# Patient Record
Sex: MALE | Race: WHITE | URBAN - METROPOLITAN AREA
[De-identification: names, ages, dates, MRNs, and addresses within clinical notes are randomized per-mention and may not be internally consistent; named-entity substitution may affect disease eponyms.]

---

## 2020-06-14 ENCOUNTER — HOSPITAL ENCOUNTER (OUTPATIENT)
Dept: MEDSURG UNIT | Facility: HOSPITAL | Age: 60
End: 2020-06-15
Attending: INTERNAL MEDICINE | Admitting: INTERNAL MEDICINE

## 2020-06-14 LAB
ABS NEUT (OLG): 6.5 X10(3)/MCL (ref 2.1–9.2)
ALBUMIN SERPL-MCNC: 3.4 GM/DL (ref 3.4–5)
ALBUMIN/GLOB SERPL: 1 RATIO (ref 1.1–2)
ALP SERPL-CCNC: 119 UNIT/L (ref 45–117)
ALT SERPL-CCNC: 43 UNIT/L (ref 12–78)
AMPHET UR QL SCN: NEGATIVE
APPEARANCE, UA: CLEAR
APTT PPP: 25.8 SECOND(S) (ref 23.3–37)
AST SERPL-CCNC: 27 UNIT/L (ref 15–37)
BACTERIA #/AREA URNS AUTO: ABNORMAL /HPF
BARBITURATE SCN PRESENT UR: NEGATIVE
BASOPHILS # BLD AUTO: 0 X10(3)/MCL (ref 0–0.2)
BASOPHILS NFR BLD AUTO: 0 %
BENZODIAZ UR QL SCN: NEGATIVE
BILIRUB SERPL-MCNC: 0.7 MG/DL (ref 0.2–1)
BILIRUB UR QL STRIP: NEGATIVE
BILIRUBIN DIRECT+TOT PNL SERPL-MCNC: 0.2 MG/DL (ref 0–0.2)
BILIRUBIN DIRECT+TOT PNL SERPL-MCNC: 0.5 MG/DL
BUN SERPL-MCNC: 18 MG/DL (ref 7–18)
CALCIUM SERPL-MCNC: 8.6 MG/DL (ref 8.5–10.1)
CANNABINOIDS UR QL SCN: NEGATIVE
CHLORIDE SERPL-SCNC: 107 MMOL/L (ref 98–107)
CK MB SERPL-MCNC: 1.8 NG/ML (ref 1–3.6)
CK SERPL-CCNC: 72 UNIT/L (ref 39–308)
CO2 SERPL-SCNC: 25 MMOL/L (ref 21–32)
COCAINE UR QL SCN: NEGATIVE
COLOR UR: ABNORMAL
CREAT SERPL-MCNC: 0.8 MG/DL (ref 0.6–1.3)
D DIMER PPP IA.FEU-MCNC: 3.48 MCG/ML FEU
EOSINOPHIL # BLD AUTO: 0.2 X10(3)/MCL (ref 0–0.9)
EOSINOPHIL NFR BLD AUTO: 2 %
ERYTHROCYTE [DISTWIDTH] IN BLOOD BY AUTOMATED COUNT: 13.3 % (ref 11.5–14.5)
EST. AVERAGE GLUCOSE BLD GHB EST-MCNC: 97 MG/DL
GLOBULIN SER-MCNC: 3.4 GM/ML (ref 2.3–3.5)
GLUCOSE (UA): NEGATIVE
GLUCOSE SERPL-MCNC: 98 MG/DL (ref 74–106)
HBA1C MFR BLD: 5 % (ref 4.2–6.3)
HCT VFR BLD AUTO: 41.6 % (ref 40–51)
HGB BLD-MCNC: 14.1 GM/DL (ref 13.5–17.5)
HGB UR QL STRIP: NEGATIVE
HYALINE CASTS #/AREA URNS LPF: ABNORMAL /LPF
IMM GRANULOCYTES # BLD AUTO: 0.02 10*3/UL
IMM GRANULOCYTES NFR BLD AUTO: 0 %
INR PPP: 1.08 (ref 0.9–1.2)
KETONES UR QL STRIP: ABNORMAL
LEUKOCYTE ESTERASE UR QL STRIP: NEGATIVE
LYMPHOCYTES # BLD AUTO: 1.3 X10(3)/MCL (ref 0.6–4.6)
LYMPHOCYTES NFR BLD AUTO: 15 %
MAGNESIUM SERPL-MCNC: 1.9 MG/DL (ref 1.6–2.6)
MCH RBC QN AUTO: 33.8 PG (ref 26–34)
MCHC RBC AUTO-ENTMCNC: 33.9 GM/DL (ref 31–37)
MCV RBC AUTO: 99.8 FL (ref 80–100)
MONOCYTES # BLD AUTO: 0.6 X10(3)/MCL (ref 0.1–1.3)
MONOCYTES NFR BLD AUTO: 7 %
NEUTROPHILS # BLD AUTO: 6.5 X10(3)/MCL (ref 2.1–9.2)
NEUTROPHILS NFR BLD AUTO: 76 %
NITRITE UR QL STRIP: NEGATIVE
OPIATES UR QL SCN: NEGATIVE
PCP UR QL: NEGATIVE
PH UR STRIP.AUTO: 6 [PH] (ref 5–8)
PH UR STRIP: 6 [PH] (ref 4.5–8)
PHOSPHATE SERPL-MCNC: 4 MG/DL (ref 2.5–4.9)
PLATELET # BLD AUTO: 182 X10(3)/MCL (ref 130–400)
PMV BLD AUTO: 9.5 FL (ref 7.4–10.4)
POTASSIUM SERPL-SCNC: 4.2 MMOL/L (ref 3.5–5.1)
PROT SERPL-MCNC: 6.8 GM/DL (ref 6.4–8.2)
PROT UR QL STRIP: NEGATIVE
PROTHROMBIN TIME: 13.6 SECOND(S) (ref 11.9–14.4)
RBC # BLD AUTO: 4.17 X10(6)/MCL (ref 4.5–5.9)
RBC #/AREA URNS AUTO: ABNORMAL /HPF
SARS-COV-2 AG RESP QL IA.RAPID: NEGATIVE
SODIUM SERPL-SCNC: 136 MMOL/L (ref 136–145)
SP GR UR STRIP: 1.04 (ref 1–1.03)
SQUAMOUS #/AREA URNS LPF: ABNORMAL /LPF
TEMPERATURE, URINE (OHS): 23 DEGC (ref 20–25)
TROPONIN I SERPL-MCNC: 0.03 NG/ML (ref 0–0.05)
UROBILINOGEN UR STRIP-ACNC: NORMAL
WBC # SPEC AUTO: 8.6 X10(3)/MCL (ref 4.5–11)
WBC #/AREA URNS AUTO: ABNORMAL /HPF

## 2020-06-15 LAB
ABS NEUT (OLG): 8.62 X10(3)/MCL (ref 2.1–9.2)
ALBUMIN SERPL-MCNC: 3.4 GM/DL (ref 3.4–5)
ALBUMIN/GLOB SERPL: 0.9 RATIO (ref 1.1–2)
ALP SERPL-CCNC: 126 UNIT/L (ref 45–117)
ALT SERPL-CCNC: 41 UNIT/L (ref 12–78)
AST SERPL-CCNC: 25 UNIT/L (ref 15–37)
BASOPHILS # BLD AUTO: 0 X10(3)/MCL (ref 0–0.2)
BASOPHILS NFR BLD AUTO: 0 %
BILIRUB SERPL-MCNC: 1.1 MG/DL (ref 0.2–1)
BILIRUBIN DIRECT+TOT PNL SERPL-MCNC: 0.4 MG/DL (ref 0–0.2)
BILIRUBIN DIRECT+TOT PNL SERPL-MCNC: 0.7 MG/DL
BUN SERPL-MCNC: 17 MG/DL (ref 7–18)
CALCIUM SERPL-MCNC: 8.8 MG/DL (ref 8.5–10.1)
CHLORIDE SERPL-SCNC: 106 MMOL/L (ref 98–107)
CHOLEST SERPL-MCNC: 160 MG/DL
CHOLEST/HDLC SERPL: 3.7 {RATIO} (ref 0–5)
CO2 SERPL-SCNC: 23 MMOL/L (ref 21–32)
CREAT SERPL-MCNC: 0.8 MG/DL (ref 0.6–1.3)
EOSINOPHIL # BLD AUTO: 0 X10(3)/MCL (ref 0–0.9)
EOSINOPHIL NFR BLD AUTO: 0 %
ERYTHROCYTE [DISTWIDTH] IN BLOOD BY AUTOMATED COUNT: 13.1 % (ref 11.5–14.5)
GLOBULIN SER-MCNC: 3.6 GM/ML (ref 2.3–3.5)
GLUCOSE SERPL-MCNC: 116 MG/DL (ref 74–106)
HCT VFR BLD AUTO: 41.6 % (ref 40–51)
HDLC SERPL-MCNC: 43 MG/DL (ref 40–59)
HGB BLD-MCNC: 14.1 GM/DL (ref 13.5–17.5)
IMM GRANULOCYTES # BLD AUTO: 0.05 10*3/UL
IMM GRANULOCYTES NFR BLD AUTO: 0 %
INR PPP: 1.08 (ref 0.9–1.2)
LDLC SERPL CALC-MCNC: 108 MG/DL
LYMPHOCYTES # BLD AUTO: 0.7 X10(3)/MCL (ref 0.6–4.6)
LYMPHOCYTES NFR BLD AUTO: 8 %
MAGNESIUM SERPL-MCNC: 1.7 MG/DL (ref 1.6–2.6)
MCH RBC QN AUTO: 32.9 PG (ref 26–34)
MCHC RBC AUTO-ENTMCNC: 33.9 GM/DL (ref 31–37)
MCV RBC AUTO: 97.2 FL (ref 80–100)
MONOCYTES # BLD AUTO: 0.2 X10(3)/MCL (ref 0.1–1.3)
MONOCYTES NFR BLD AUTO: 2 %
NEUTROPHILS # BLD AUTO: 8.62 X10(3)/MCL (ref 2.1–9.2)
NEUTROPHILS NFR BLD AUTO: 90 %
PHOSPHATE SERPL-MCNC: 3.5 MG/DL (ref 2.5–4.9)
PLATELET # BLD AUTO: 185 X10(3)/MCL (ref 130–400)
PMV BLD AUTO: 9.9 FL (ref 7.4–10.4)
POTASSIUM SERPL-SCNC: 3.8 MMOL/L (ref 3.5–5.1)
PROT SERPL-MCNC: 7 GM/DL (ref 6.4–8.2)
PROTHROMBIN TIME: 13.6 SECOND(S) (ref 11.9–14.4)
RBC # BLD AUTO: 4.28 X10(6)/MCL (ref 4.5–5.9)
SODIUM SERPL-SCNC: 137 MMOL/L (ref 136–145)
TRIGL SERPL-MCNC: 44 MG/DL
TSH SERPL-ACNC: 1.7 MIU/L (ref 0.36–3.74)
VLDLC SERPL CALC-MCNC: 9 MG/DL
WBC # SPEC AUTO: 9.6 X10(3)/MCL (ref 4.5–11)

## 2021-05-11 ENCOUNTER — HOSPITAL ENCOUNTER (OUTPATIENT)
Dept: MEDSURG UNIT | Facility: HOSPITAL | Age: 61
End: 2021-05-14
Attending: INTERNAL MEDICINE | Admitting: EMERGENCY MEDICINE

## 2021-05-11 LAB
ABS NEUT (OLG): 9.85 X10(3)/MCL (ref 2.1–9.2)
ALBUMIN SERPL-MCNC: 4.1 GM/DL (ref 3.4–4.8)
ALBUMIN/GLOB SERPL: 1.2 RATIO (ref 1.1–2)
ALP SERPL-CCNC: 139 UNIT/L (ref 40–150)
ALT SERPL-CCNC: 20 UNIT/L (ref 0–55)
AMPHET UR QL SCN: NEGATIVE
AST SERPL-CCNC: 42 UNIT/L (ref 5–34)
BARBITURATE SCN PRESENT UR: NEGATIVE
BASOPHILS # BLD AUTO: 0 X10(3)/MCL (ref 0–0.2)
BASOPHILS NFR BLD AUTO: 0 %
BENZODIAZ UR QL SCN: NEGATIVE
BILIRUB SERPL-MCNC: 2.7 MG/DL
BILIRUBIN DIRECT+TOT PNL SERPL-MCNC: 1.1 MG/DL (ref 0–0.5)
BILIRUBIN DIRECT+TOT PNL SERPL-MCNC: 1.6 MG/DL (ref 0–0.8)
BNP BLD-MCNC: 2487.9 PG/ML
BUN SERPL-MCNC: 22.6 MG/DL (ref 8.4–25.7)
CALCIUM SERPL-MCNC: 9.6 MG/DL (ref 8.8–10)
CANNABINOIDS UR QL SCN: NEGATIVE
CHLORIDE SERPL-SCNC: 99 MMOL/L (ref 98–107)
CO2 SERPL-SCNC: 21 MMOL/L (ref 23–31)
COCAINE UR QL SCN: NEGATIVE
CREAT SERPL-MCNC: 1.13 MG/DL (ref 0.73–1.18)
ERYTHROCYTE [DISTWIDTH] IN BLOOD BY AUTOMATED COUNT: 17.2 % (ref 11.5–17)
GLOBULIN SER-MCNC: 3.4 GM/DL (ref 2.4–3.5)
GLUCOSE SERPL-MCNC: 102 MG/DL (ref 82–115)
HCT VFR BLD AUTO: 44.8 % (ref 42–52)
HGB BLD-MCNC: 14.3 GM/DL (ref 14–18)
INR PPP: 1.5 (ref 0–1.3)
LYMPHOCYTES # BLD AUTO: 0.9 X10(3)/MCL (ref 0.6–4.6)
LYMPHOCYTES NFR BLD AUTO: 8 %
MCH RBC QN AUTO: 28.9 PG (ref 27–31)
MCHC RBC AUTO-ENTMCNC: 31.9 GM/DL (ref 33–36)
MCV RBC AUTO: 90.7 FL (ref 80–94)
MONOCYTES # BLD AUTO: 0.8 X10(3)/MCL (ref 0.1–1.3)
MONOCYTES NFR BLD AUTO: 7 %
NEUTROPHILS # BLD AUTO: 9.85 X10(3)/MCL (ref 2.1–9.2)
NEUTROPHILS NFR BLD AUTO: 84 %
OPIATES UR QL SCN: NEGATIVE
PCP UR QL: NEGATIVE
PH UR STRIP.AUTO: 5 [PH] (ref 5–7.5)
PLATELET # BLD AUTO: 186 X10(3)/MCL (ref 130–400)
PMV BLD AUTO: 10.4 FL (ref 9.4–12.4)
POTASSIUM SERPL-SCNC: 4.2 MMOL/L (ref 3.5–5.1)
PROT SERPL-MCNC: 7.5 GM/DL (ref 5.8–7.6)
PROTHROMBIN TIME: 17.7 SECOND(S) (ref 11.1–13.7)
RBC # BLD AUTO: 4.94 X10(6)/MCL (ref 4.7–6.1)
SODIUM SERPL-SCNC: 138 MMOL/L (ref 136–145)
SP GR FLD REFRACTOMETRY: 1.03 (ref 1–1.03)
TROPONIN I SERPL-MCNC: 0.05 NG/ML (ref 0–0.04)
WBC # SPEC AUTO: 11.7 X10(3)/MCL (ref 4.5–11.5)

## 2021-05-12 LAB
ABS NEUT (OLG): 7.5 X10(3)/MCL (ref 2.1–9.2)
BASOPHILS # BLD AUTO: 0 X10(3)/MCL (ref 0–0.2)
BASOPHILS NFR BLD AUTO: 0 %
BUN SERPL-MCNC: 25.3 MG/DL (ref 8.4–25.7)
CALCIUM SERPL-MCNC: 8.9 MG/DL (ref 8.8–10)
CHLORIDE SERPL-SCNC: 94 MMOL/L (ref 98–107)
CHOLEST SERPL-MCNC: 157 MG/DL
CHOLEST/HDLC SERPL: 4 {RATIO} (ref 0–5)
CK MB SERPL-MCNC: 4.5 NG/ML
CK SERPL-CCNC: 146 U/L (ref 30–200)
CO2 SERPL-SCNC: 26 MMOL/L (ref 23–31)
CREAT SERPL-MCNC: 0.86 MG/DL (ref 0.73–1.18)
CREAT/UREA NIT SERPL: 29
EOSINOPHIL # BLD AUTO: 0 X10(3)/MCL (ref 0–0.9)
EOSINOPHIL NFR BLD AUTO: 0 %
ERYTHROCYTE [DISTWIDTH] IN BLOOD BY AUTOMATED COUNT: 16.8 % (ref 11.5–17)
EST. AVERAGE GLUCOSE BLD GHB EST-MCNC: 102.5 MG/DL
GLUCOSE SERPL-MCNC: 85 MG/DL (ref 82–115)
HBA1C MFR BLD: 5.2 %
HCT VFR BLD AUTO: 41.2 % (ref 42–52)
HDLC SERPL-MCNC: 35 MG/DL (ref 35–60)
HGB BLD-MCNC: 13.6 GM/DL (ref 14–18)
INR PPP: 1.5 (ref 0–1.3)
LDLC SERPL CALC-MCNC: 106 MG/DL (ref 50–140)
LYMPHOCYTES # BLD AUTO: 1.7 X10(3)/MCL (ref 0.6–4.6)
LYMPHOCYTES NFR BLD AUTO: 17 %
MAGNESIUM SERPL-MCNC: 1.5 MG/DL (ref 1.6–2.6)
MCH RBC QN AUTO: 28.8 PG (ref 27–31)
MCHC RBC AUTO-ENTMCNC: 33 GM/DL (ref 33–36)
MCV RBC AUTO: 87.1 FL (ref 80–94)
MONOCYTES # BLD AUTO: 0.9 X10(3)/MCL (ref 0.1–1.3)
MONOCYTES NFR BLD AUTO: 9 %
NEUTROPHILS # BLD AUTO: 7.5 X10(3)/MCL (ref 2.1–9.2)
NEUTROPHILS NFR BLD AUTO: 73 %
PLATELET # BLD AUTO: 167 X10(3)/MCL (ref 130–400)
PMV BLD AUTO: 10 FL (ref 9.4–12.4)
POTASSIUM SERPL-SCNC: 3.1 MMOL/L (ref 3.5–5.1)
PROTHROMBIN TIME: 17.6 SECOND(S) (ref 11.1–13.7)
RBC # BLD AUTO: 4.73 X10(6)/MCL (ref 4.7–6.1)
SODIUM SERPL-SCNC: 133 MMOL/L (ref 136–145)
TRIGL SERPL-MCNC: 82 MG/DL (ref 34–140)
TROPONIN I SERPL-MCNC: 0.09 NG/ML (ref 0–0.04)
VLDLC SERPL CALC-MCNC: 16 MG/DL
WBC # SPEC AUTO: 10.2 X10(3)/MCL (ref 4.5–11.5)

## 2021-05-13 LAB
ABS NEUT (OLG): 7.33 X10(3)/MCL (ref 2.1–9.2)
BASOPHILS # BLD AUTO: 0 X10(3)/MCL (ref 0–0.2)
BASOPHILS NFR BLD AUTO: 0 %
BUN SERPL-MCNC: 22.6 MG/DL (ref 8.4–25.7)
CALCIUM SERPL-MCNC: 8.8 MG/DL (ref 8.8–10)
CHLORIDE SERPL-SCNC: 92 MMOL/L (ref 98–107)
CO2 SERPL-SCNC: 31 MMOL/L (ref 23–31)
CREAT SERPL-MCNC: 0.81 MG/DL (ref 0.73–1.18)
CREAT/UREA NIT SERPL: 28
EOSINOPHIL # BLD AUTO: 0 X10(3)/MCL (ref 0–0.9)
EOSINOPHIL NFR BLD AUTO: 0 %
ERYTHROCYTE [DISTWIDTH] IN BLOOD BY AUTOMATED COUNT: 16.9 % (ref 11.5–17)
GLUCOSE SERPL-MCNC: 90 MG/DL (ref 82–115)
HCT VFR BLD AUTO: 42.6 % (ref 42–52)
HGB BLD-MCNC: 14.3 GM/DL (ref 14–18)
LYMPHOCYTES # BLD AUTO: 1.7 X10(3)/MCL (ref 0.6–4.6)
LYMPHOCYTES NFR BLD AUTO: 17 %
MAGNESIUM SERPL-MCNC: 1.7 MG/DL (ref 1.6–2.6)
MCH RBC QN AUTO: 29.1 PG (ref 27–31)
MCHC RBC AUTO-ENTMCNC: 33.6 GM/DL (ref 33–36)
MCV RBC AUTO: 86.6 FL (ref 80–94)
MONOCYTES # BLD AUTO: 0.9 X10(3)/MCL (ref 0.1–1.3)
MONOCYTES NFR BLD AUTO: 9 %
NEUTROPHILS # BLD AUTO: 7.33 X10(3)/MCL (ref 2.1–9.2)
NEUTROPHILS NFR BLD AUTO: 74 %
PLATELET # BLD AUTO: 193 X10(3)/MCL (ref 130–400)
PMV BLD AUTO: 9.8 FL (ref 9.4–12.4)
POTASSIUM SERPL-SCNC: 3.4 MMOL/L (ref 3.5–5.1)
RBC # BLD AUTO: 4.92 X10(6)/MCL (ref 4.7–6.1)
SODIUM SERPL-SCNC: 136 MMOL/L (ref 136–145)
WBC # SPEC AUTO: 10 X10(3)/MCL (ref 4.5–11.5)

## 2021-05-14 LAB
ABS NEUT (OLG): 6.6 X10(3)/MCL (ref 2.1–9.2)
ALBUMIN SERPL-MCNC: 3.4 GM/DL (ref 3.4–4.8)
ALBUMIN/GLOB SERPL: 1.2 RATIO (ref 1.1–2)
ALP SERPL-CCNC: 124 UNIT/L (ref 40–150)
ALT SERPL-CCNC: 226 UNIT/L (ref 0–55)
AST SERPL-CCNC: 178 UNIT/L (ref 5–34)
BASOPHILS # BLD AUTO: 0 X10(3)/MCL (ref 0–0.2)
BASOPHILS NFR BLD AUTO: 0 %
BILIRUB SERPL-MCNC: 1.1 MG/DL
BILIRUBIN DIRECT+TOT PNL SERPL-MCNC: 0.4 MG/DL (ref 0–0.5)
BILIRUBIN DIRECT+TOT PNL SERPL-MCNC: 0.7 MG/DL (ref 0–0.8)
BUN SERPL-MCNC: 19.6 MG/DL (ref 8.4–25.7)
CALCIUM SERPL-MCNC: 8.5 MG/DL (ref 8.8–10)
CHLORIDE SERPL-SCNC: 93 MMOL/L (ref 98–107)
CO2 SERPL-SCNC: 27 MMOL/L (ref 23–31)
CREAT SERPL-MCNC: 0.81 MG/DL (ref 0.73–1.18)
EOSINOPHIL # BLD AUTO: 0 X10(3)/MCL (ref 0–0.9)
EOSINOPHIL NFR BLD AUTO: 0 %
ERYTHROCYTE [DISTWIDTH] IN BLOOD BY AUTOMATED COUNT: 16.8 % (ref 11.5–17)
GLOBULIN SER-MCNC: 2.8 GM/DL (ref 2.4–3.5)
GLUCOSE SERPL-MCNC: 114 MG/DL (ref 82–115)
HCT VFR BLD AUTO: 43.4 % (ref 42–52)
HGB BLD-MCNC: 14.1 GM/DL (ref 14–18)
LYMPHOCYTES # BLD AUTO: 1.6 X10(3)/MCL (ref 0.6–4.6)
LYMPHOCYTES NFR BLD AUTO: 18 %
MAGNESIUM SERPL-MCNC: 1.9 MG/DL (ref 1.6–2.6)
MCH RBC QN AUTO: 28.8 PG (ref 27–31)
MCHC RBC AUTO-ENTMCNC: 32.5 GM/DL (ref 33–36)
MCV RBC AUTO: 88.6 FL (ref 80–94)
MONOCYTES # BLD AUTO: 0.9 X10(3)/MCL (ref 0.1–1.3)
MONOCYTES NFR BLD AUTO: 10 %
NEUTROPHILS # BLD AUTO: 6.6 X10(3)/MCL (ref 2.1–9.2)
NEUTROPHILS NFR BLD AUTO: 71 %
PLATELET # BLD AUTO: 191 X10(3)/MCL (ref 130–400)
PMV BLD AUTO: 10.5 FL (ref 9.4–12.4)
POTASSIUM SERPL-SCNC: 4.1 MMOL/L (ref 3.5–5.1)
PROT SERPL-MCNC: 6.2 GM/DL (ref 5.8–7.6)
RBC # BLD AUTO: 4.9 X10(6)/MCL (ref 4.7–6.1)
SODIUM SERPL-SCNC: 131 MMOL/L (ref 136–145)
WBC # SPEC AUTO: 9.3 X10(3)/MCL (ref 4.5–11.5)

## 2021-10-06 ENCOUNTER — HOSPITAL ENCOUNTER (OUTPATIENT)
Dept: NUTRITION | Facility: HOSPITAL | Age: 61
End: 2021-10-13
Attending: INTERNAL MEDICINE | Admitting: INTERNAL MEDICINE

## 2021-10-06 LAB
ABS NEUT (OLG): 7.95 X10(3)/MCL (ref 2.1–9.2)
BASOPHILS # BLD AUTO: 0.1 X10(3)/MCL (ref 0–0.2)
BASOPHILS NFR BLD AUTO: 1 %
BNP BLD-MCNC: 1303.6 PG/ML
BUN SERPL-MCNC: 15.7 MG/DL (ref 8.4–25.7)
CALCIUM SERPL-MCNC: 10 MG/DL (ref 8.8–10)
CHLORIDE SERPL-SCNC: 98 MMOL/L (ref 98–107)
CO2 SERPL-SCNC: 22 MMOL/L (ref 23–31)
CREAT SERPL-MCNC: 0.87 MG/DL (ref 0.73–1.18)
CREAT/UREA NIT SERPL: 18
EOSINOPHIL # BLD AUTO: 0.1 X10(3)/MCL (ref 0–0.9)
EOSINOPHIL NFR BLD AUTO: 1 %
ERYTHROCYTE [DISTWIDTH] IN BLOOD BY AUTOMATED COUNT: 13.2 % (ref 11.5–17)
GLUCOSE SERPL-MCNC: 85 MG/DL (ref 82–115)
HCT VFR BLD AUTO: 49.4 % (ref 42–52)
HGB BLD-MCNC: 16.9 GM/DL (ref 14–18)
INR PPP: 1.2 (ref 0–1.3)
LYMPHOCYTES # BLD AUTO: 2.4 X10(3)/MCL (ref 0.6–4.6)
LYMPHOCYTES NFR BLD AUTO: 21 %
MCH RBC QN AUTO: 32.6 PG (ref 27–31)
MCHC RBC AUTO-ENTMCNC: 34.2 GM/DL (ref 33–36)
MCV RBC AUTO: 95.2 FL (ref 80–94)
MONOCYTES # BLD AUTO: 0.9 X10(3)/MCL (ref 0.1–1.3)
MONOCYTES NFR BLD AUTO: 8 %
NEUTROPHILS # BLD AUTO: 7.95 X10(3)/MCL (ref 2.1–9.2)
NEUTROPHILS NFR BLD AUTO: 69 %
PLATELET # BLD AUTO: 280 X10(3)/MCL (ref 130–400)
PMV BLD AUTO: 9.7 FL (ref 9.4–12.4)
POTASSIUM SERPL-SCNC: 3.8 MMOL/L (ref 3.5–5.1)
PROTHROMBIN TIME: 14.6 SECOND(S) (ref 12.5–14.5)
RBC # BLD AUTO: 5.19 X10(6)/MCL (ref 4.7–6.1)
SODIUM SERPL-SCNC: 136 MMOL/L (ref 136–145)
WBC # SPEC AUTO: 11.5 X10(3)/MCL (ref 4.5–11.5)

## 2021-10-07 LAB
ABS NEUT (OLG): 9.09 X10(3)/MCL (ref 2.1–9.2)
ALBUMIN SERPL-MCNC: 4.2 GM/DL (ref 3.4–4.8)
ALBUMIN/GLOB SERPL: 1.3 RATIO (ref 1.1–2)
ALP SERPL-CCNC: 109 UNIT/L (ref 40–150)
ALT SERPL-CCNC: 11 UNIT/L (ref 0–55)
AST SERPL-CCNC: 21 UNIT/L (ref 5–34)
BASOPHILS # BLD AUTO: 0.1 X10(3)/MCL (ref 0–0.2)
BASOPHILS NFR BLD AUTO: 0 %
BILIRUB SERPL-MCNC: 0.9 MG/DL
BILIRUBIN DIRECT+TOT PNL SERPL-MCNC: 0.3 MG/DL (ref 0–0.5)
BILIRUBIN DIRECT+TOT PNL SERPL-MCNC: 0.6 MG/DL (ref 0–0.8)
BUN SERPL-MCNC: 19.4 MG/DL (ref 8.4–25.7)
CALCIUM SERPL-MCNC: 9.8 MG/DL (ref 8.8–10)
CHLORIDE SERPL-SCNC: 98 MMOL/L (ref 98–107)
CO2 SERPL-SCNC: 22 MMOL/L (ref 23–31)
CREAT SERPL-MCNC: 0.95 MG/DL (ref 0.73–1.18)
EOSINOPHIL # BLD AUTO: 0.3 X10(3)/MCL (ref 0–0.9)
EOSINOPHIL NFR BLD AUTO: 2 %
ERYTHROCYTE [DISTWIDTH] IN BLOOD BY AUTOMATED COUNT: 13.2 % (ref 11.5–17)
GLOBULIN SER-MCNC: 3.3 GM/DL (ref 2.4–3.5)
GLUCOSE SERPL-MCNC: 72 MG/DL (ref 82–115)
HCT VFR BLD AUTO: 51 % (ref 42–52)
HGB BLD-MCNC: 17.5 GM/DL (ref 14–18)
LYMPHOCYTES # BLD AUTO: 2.8 X10(3)/MCL (ref 0.6–4.6)
LYMPHOCYTES NFR BLD AUTO: 21 %
MCH RBC QN AUTO: 32.5 PG (ref 27–31)
MCHC RBC AUTO-ENTMCNC: 34.3 GM/DL (ref 33–36)
MCV RBC AUTO: 94.6 FL (ref 80–94)
MONOCYTES # BLD AUTO: 1.2 X10(3)/MCL (ref 0.1–1.3)
MONOCYTES NFR BLD AUTO: 9 %
NEUTROPHILS # BLD AUTO: 9.09 X10(3)/MCL (ref 2.1–9.2)
NEUTROPHILS NFR BLD AUTO: 67 %
PLATELET # BLD AUTO: 295 X10(3)/MCL (ref 130–400)
PMV BLD AUTO: 10.4 FL (ref 9.4–12.4)
POTASSIUM SERPL-SCNC: 4.1 MMOL/L (ref 3.5–5.1)
PROT SERPL-MCNC: 7.5 GM/DL (ref 5.8–7.6)
RBC # BLD AUTO: 5.39 X10(6)/MCL (ref 4.7–6.1)
SODIUM SERPL-SCNC: 138 MMOL/L (ref 136–145)
WBC # SPEC AUTO: 13.6 X10(3)/MCL (ref 4.5–11.5)

## 2021-10-08 LAB
ABS NEUT (OLG): 6.04 X10(3)/MCL (ref 2.1–9.2)
ALBUMIN SERPL-MCNC: 4.1 GM/DL (ref 3.4–4.8)
ALBUMIN/GLOB SERPL: 1.5 RATIO (ref 1.1–2)
ALP SERPL-CCNC: 100 UNIT/L (ref 40–150)
ALT SERPL-CCNC: 10 UNIT/L (ref 0–55)
AST SERPL-CCNC: 19 UNIT/L (ref 5–34)
BASOPHILS # BLD AUTO: 0.1 X10(3)/MCL (ref 0–0.2)
BASOPHILS NFR BLD AUTO: 1 %
BILIRUB SERPL-MCNC: 0.9 MG/DL
BILIRUBIN DIRECT+TOT PNL SERPL-MCNC: 0.3 MG/DL (ref 0–0.5)
BILIRUBIN DIRECT+TOT PNL SERPL-MCNC: 0.6 MG/DL (ref 0–0.8)
BUN SERPL-MCNC: 20.4 MG/DL (ref 8.4–25.7)
CALCIUM SERPL-MCNC: 9.9 MG/DL (ref 8.8–10)
CHLORIDE SERPL-SCNC: 99 MMOL/L (ref 98–107)
CO2 SERPL-SCNC: 26 MMOL/L (ref 23–31)
CREAT SERPL-MCNC: 0.95 MG/DL (ref 0.73–1.18)
EOSINOPHIL # BLD AUTO: 0.2 X10(3)/MCL (ref 0–0.9)
EOSINOPHIL NFR BLD AUTO: 3 %
ERYTHROCYTE [DISTWIDTH] IN BLOOD BY AUTOMATED COUNT: 13 % (ref 11.5–17)
GLOBULIN SER-MCNC: 2.8 GM/DL (ref 2.4–3.5)
GLUCOSE SERPL-MCNC: 96 MG/DL (ref 82–115)
HCT VFR BLD AUTO: 49 % (ref 42–52)
HGB BLD-MCNC: 16.5 GM/DL (ref 14–18)
LYMPHOCYTES # BLD AUTO: 2.4 X10(3)/MCL (ref 0.6–4.6)
LYMPHOCYTES NFR BLD AUTO: 25 %
MCH RBC QN AUTO: 32 PG (ref 27–31)
MCHC RBC AUTO-ENTMCNC: 33.7 GM/DL (ref 33–36)
MCV RBC AUTO: 95 FL (ref 80–94)
MONOCYTES # BLD AUTO: 0.8 X10(3)/MCL (ref 0.1–1.3)
MONOCYTES NFR BLD AUTO: 9 %
NEUTROPHILS # BLD AUTO: 6.04 X10(3)/MCL (ref 2.1–9.2)
NEUTROPHILS NFR BLD AUTO: 63 %
PLATELET # BLD AUTO: 262 X10(3)/MCL (ref 130–400)
PMV BLD AUTO: 10 FL (ref 9.4–12.4)
POTASSIUM SERPL-SCNC: 4.1 MMOL/L (ref 3.5–5.1)
PROT SERPL-MCNC: 6.9 GM/DL (ref 5.8–7.6)
RBC # BLD AUTO: 5.16 X10(6)/MCL (ref 4.7–6.1)
SODIUM SERPL-SCNC: 138 MMOL/L (ref 136–145)
WBC # SPEC AUTO: 9.6 X10(3)/MCL (ref 4.5–11.5)

## 2021-10-09 LAB
ABS NEUT (OLG): 6.58 X10(3)/MCL (ref 2.1–9.2)
ALBUMIN SERPL-MCNC: 3.7 GM/DL (ref 3.4–4.8)
ALBUMIN/GLOB SERPL: 1.4 RATIO (ref 1.1–2)
ALP SERPL-CCNC: 86 UNIT/L (ref 40–150)
ALT SERPL-CCNC: 9 UNIT/L (ref 0–55)
AST SERPL-CCNC: 14 UNIT/L (ref 5–34)
BASOPHILS # BLD AUTO: 0.1 X10(3)/MCL (ref 0–0.2)
BASOPHILS NFR BLD AUTO: 1 %
BILIRUB SERPL-MCNC: 0.6 MG/DL
BILIRUBIN DIRECT+TOT PNL SERPL-MCNC: 0.3 MG/DL (ref 0–0.5)
BILIRUBIN DIRECT+TOT PNL SERPL-MCNC: 0.3 MG/DL (ref 0–0.8)
BUN SERPL-MCNC: 28.8 MG/DL (ref 8.4–25.7)
CALCIUM SERPL-MCNC: 9.5 MG/DL (ref 8.8–10)
CHLORIDE SERPL-SCNC: 97 MMOL/L (ref 98–107)
CO2 SERPL-SCNC: 25 MMOL/L (ref 23–31)
CREAT SERPL-MCNC: 0.97 MG/DL (ref 0.73–1.18)
EOSINOPHIL # BLD AUTO: 0.3 X10(3)/MCL (ref 0–0.9)
EOSINOPHIL NFR BLD AUTO: 3 %
ERYTHROCYTE [DISTWIDTH] IN BLOOD BY AUTOMATED COUNT: 12.8 % (ref 11.5–17)
GLOBULIN SER-MCNC: 2.6 GM/DL (ref 2.4–3.5)
GLUCOSE SERPL-MCNC: 179 MG/DL (ref 82–115)
HCT VFR BLD AUTO: 43.7 % (ref 42–52)
HGB BLD-MCNC: 15.1 GM/DL (ref 14–18)
LYMPHOCYTES # BLD AUTO: 2 X10(3)/MCL (ref 0.6–4.6)
LYMPHOCYTES NFR BLD AUTO: 20 %
MCH RBC QN AUTO: 32.5 PG (ref 27–31)
MCHC RBC AUTO-ENTMCNC: 34.6 GM/DL (ref 33–36)
MCV RBC AUTO: 94 FL (ref 80–94)
MONOCYTES # BLD AUTO: 0.9 X10(3)/MCL (ref 0.1–1.3)
MONOCYTES NFR BLD AUTO: 9 %
NEUTROPHILS # BLD AUTO: 6.58 X10(3)/MCL (ref 2.1–9.2)
NEUTROPHILS NFR BLD AUTO: 66 %
PLATELET # BLD AUTO: 244 X10(3)/MCL (ref 130–400)
PMV BLD AUTO: 9.9 FL (ref 9.4–12.4)
POTASSIUM SERPL-SCNC: 3.9 MMOL/L (ref 3.5–5.1)
PROT SERPL-MCNC: 6.3 GM/DL (ref 5.8–7.6)
RBC # BLD AUTO: 4.65 X10(6)/MCL (ref 4.7–6.1)
SODIUM SERPL-SCNC: 134 MMOL/L (ref 136–145)
WBC # SPEC AUTO: 9.9 X10(3)/MCL (ref 4.5–11.5)

## 2021-10-10 LAB
ABS NEUT (OLG): 4.62 X10(3)/MCL (ref 2.1–9.2)
ALBUMIN SERPL-MCNC: 3.7 GM/DL (ref 3.4–4.8)
ALBUMIN/GLOB SERPL: 1.4 RATIO (ref 1.1–2)
ALP SERPL-CCNC: 86 UNIT/L (ref 40–150)
ALT SERPL-CCNC: 8 UNIT/L (ref 0–55)
AST SERPL-CCNC: 17 UNIT/L (ref 5–34)
BASOPHILS # BLD AUTO: 0 X10(3)/MCL (ref 0–0.2)
BASOPHILS NFR BLD AUTO: 1 %
BILIRUB SERPL-MCNC: 0.6 MG/DL
BILIRUBIN DIRECT+TOT PNL SERPL-MCNC: 0.2 MG/DL (ref 0–0.5)
BILIRUBIN DIRECT+TOT PNL SERPL-MCNC: 0.4 MG/DL (ref 0–0.8)
BUN SERPL-MCNC: 22.1 MG/DL (ref 8.4–25.7)
CALCIUM SERPL-MCNC: 9.2 MG/DL (ref 8.8–10)
CHLORIDE SERPL-SCNC: 100 MMOL/L (ref 98–107)
CO2 SERPL-SCNC: 23 MMOL/L (ref 23–31)
CREAT SERPL-MCNC: 0.81 MG/DL (ref 0.73–1.18)
EOSINOPHIL # BLD AUTO: 0.4 X10(3)/MCL (ref 0–0.9)
EOSINOPHIL NFR BLD AUTO: 4 %
ERYTHROCYTE [DISTWIDTH] IN BLOOD BY AUTOMATED COUNT: 12.6 % (ref 11.5–17)
GLOBULIN SER-MCNC: 2.6 GM/DL (ref 2.4–3.5)
GLUCOSE SERPL-MCNC: 92 MG/DL (ref 82–115)
HCT VFR BLD AUTO: 45.6 % (ref 42–52)
HGB BLD-MCNC: 15.6 GM/DL (ref 14–18)
LYMPHOCYTES # BLD AUTO: 2.1 X10(3)/MCL (ref 0.6–4.6)
LYMPHOCYTES NFR BLD AUTO: 27 %
MAGNESIUM SERPL-MCNC: 2.2 MG/DL (ref 1.6–2.6)
MCH RBC QN AUTO: 32.2 PG (ref 27–31)
MCHC RBC AUTO-ENTMCNC: 34.2 GM/DL (ref 33–36)
MCV RBC AUTO: 94 FL (ref 80–94)
MONOCYTES # BLD AUTO: 0.8 X10(3)/MCL (ref 0.1–1.3)
MONOCYTES NFR BLD AUTO: 10 %
NEUTROPHILS # BLD AUTO: 4.62 X10(3)/MCL (ref 2.1–9.2)
NEUTROPHILS NFR BLD AUTO: 58 %
PLATELET # BLD AUTO: 163 X10(3)/MCL (ref 130–400)
PLATELET # BLD EST: NORMAL 10*3/UL
PMV BLD AUTO: 10.5 FL (ref 9.4–12.4)
POTASSIUM SERPL-SCNC: 4.2 MMOL/L (ref 3.5–5.1)
PROT SERPL-MCNC: 6.3 GM/DL (ref 5.8–7.6)
RBC # BLD AUTO: 4.85 X10(6)/MCL (ref 4.7–6.1)
RBC MORPH BLD: NORMAL
SODIUM SERPL-SCNC: 136 MMOL/L (ref 136–145)
WBC # SPEC AUTO: 8 X10(3)/MCL (ref 4.5–11.5)

## 2021-10-11 LAB
ABS NEUT (OLG): 4.13 X10(3)/MCL (ref 2.1–9.2)
ALBUMIN SERPL-MCNC: 3.5 GM/DL (ref 3.4–4.8)
ALBUMIN/GLOB SERPL: 1.1 RATIO (ref 1.1–2)
ALP SERPL-CCNC: 85 UNIT/L (ref 40–150)
ALT SERPL-CCNC: 10 UNIT/L (ref 0–55)
AST SERPL-CCNC: 19 UNIT/L (ref 5–34)
BASOPHILS # BLD AUTO: 0.1 X10(3)/MCL (ref 0–0.2)
BASOPHILS NFR BLD AUTO: 1 %
BILIRUB SERPL-MCNC: 0.4 MG/DL
BILIRUBIN DIRECT+TOT PNL SERPL-MCNC: 0.2 MG/DL (ref 0–0.5)
BILIRUBIN DIRECT+TOT PNL SERPL-MCNC: 0.2 MG/DL (ref 0–0.8)
BUN SERPL-MCNC: 24 MG/DL (ref 8.4–25.7)
CALCIUM SERPL-MCNC: 9.8 MG/DL (ref 8.8–10)
CHLORIDE SERPL-SCNC: 102 MMOL/L (ref 98–107)
CO2 SERPL-SCNC: 23 MMOL/L (ref 23–31)
CREAT SERPL-MCNC: 0.83 MG/DL (ref 0.73–1.18)
EOSINOPHIL # BLD AUTO: 0.4 X10(3)/MCL (ref 0–0.9)
EOSINOPHIL NFR BLD AUTO: 5 %
ERYTHROCYTE [DISTWIDTH] IN BLOOD BY AUTOMATED COUNT: 12.7 % (ref 11.5–17)
GLOBULIN SER-MCNC: 3.3 GM/DL (ref 2.4–3.5)
GLUCOSE SERPL-MCNC: 97 MG/DL (ref 82–115)
HCT VFR BLD AUTO: 47.4 % (ref 42–52)
HGB BLD-MCNC: 15.9 GM/DL (ref 14–18)
LYMPHOCYTES # BLD AUTO: 2.2 X10(3)/MCL (ref 0.6–4.6)
LYMPHOCYTES NFR BLD AUTO: 29 %
MAGNESIUM SERPL-MCNC: 2 MG/DL (ref 1.6–2.6)
MCH RBC QN AUTO: 32.1 PG (ref 27–31)
MCHC RBC AUTO-ENTMCNC: 33.5 GM/DL (ref 33–36)
MCV RBC AUTO: 95.8 FL (ref 80–94)
MONOCYTES # BLD AUTO: 0.7 X10(3)/MCL (ref 0.1–1.3)
MONOCYTES NFR BLD AUTO: 10 %
NEUTROPHILS # BLD AUTO: 4.13 X10(3)/MCL (ref 2.1–9.2)
NEUTROPHILS NFR BLD AUTO: 55 %
PLATELET # BLD AUTO: 205 X10(3)/MCL (ref 130–400)
PMV BLD AUTO: 10.3 FL (ref 9.4–12.4)
POTASSIUM SERPL-SCNC: 4.3 MMOL/L (ref 3.5–5.1)
PROT SERPL-MCNC: 6.8 GM/DL (ref 5.8–7.6)
RBC # BLD AUTO: 4.95 X10(6)/MCL (ref 4.7–6.1)
SODIUM SERPL-SCNC: 136 MMOL/L (ref 136–145)
WBC # SPEC AUTO: 7.5 X10(3)/MCL (ref 4.5–11.5)

## 2021-10-12 LAB
ABS NEUT (OLG): 5.26 X10(3)/MCL (ref 2.1–9.2)
BASOPHILS # BLD AUTO: 0.1 X10(3)/MCL (ref 0–0.2)
BASOPHILS NFR BLD AUTO: 1 %
EOSINOPHIL # BLD AUTO: 0.4 X10(3)/MCL (ref 0–0.9)
EOSINOPHIL NFR BLD AUTO: 4 %
ERYTHROCYTE [DISTWIDTH] IN BLOOD BY AUTOMATED COUNT: 12.6 % (ref 11.5–17)
HCT VFR BLD AUTO: 42.5 % (ref 42–52)
HGB BLD-MCNC: 14.4 GM/DL (ref 14–18)
LYMPHOCYTES # BLD AUTO: 1.9 X10(3)/MCL (ref 0.6–4.6)
LYMPHOCYTES NFR BLD AUTO: 23 %
MCH RBC QN AUTO: 32.1 PG (ref 27–31)
MCHC RBC AUTO-ENTMCNC: 33.9 GM/DL (ref 33–36)
MCV RBC AUTO: 94.9 FL (ref 80–94)
MONOCYTES # BLD AUTO: 0.8 X10(3)/MCL (ref 0.1–1.3)
MONOCYTES NFR BLD AUTO: 10 %
NEUTROPHILS # BLD AUTO: 5.26 X10(3)/MCL (ref 2.1–9.2)
NEUTROPHILS NFR BLD AUTO: 62 %
PLATELET # BLD AUTO: 197 X10(3)/MCL (ref 130–400)
PMV BLD AUTO: 10.1 FL (ref 9.4–12.4)
RBC # BLD AUTO: 4.48 X10(6)/MCL (ref 4.7–6.1)
WBC # SPEC AUTO: 8.4 X10(3)/MCL (ref 4.5–11.5)

## 2022-04-30 NOTE — DISCHARGE SUMMARY
Patient:   Markus Guillen JR             MRN: [id number]            FIN: [id number]               Age:   61 years     Sex:  Male     :  1960   Associated Diagnoses:   None   Author:   Leah Chavez      Please see Progress Note from 5.14.21 as DC Summary.    Thanks.
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
## 2022-04-30 NOTE — CONSULTS
Patient:   Markus Guillen JR             MRN: [id number]            FIN: [id number]               Age:   61 years     Sex:  Male     :  1960   Associated Diagnoses:   None   Author:   Jerod Sethi MD      Chief Complaint   Fatigue, SOB/CARLSON, and chest pain      History of Present Illness   The patient is a 61-year-old male, with a history of mechanical aortic valve, HLP, CMO, PPM, ANGELA, and HTN, who appears to be much older than his stated age. He was last seen by Dr. Soto in May of this year and at that time was started on warfarin and Entresto. He has CMO with an EF of 10 to 15%, s/p AICD placement, and at last visit device was noted to be at NOE. The patient was scheduled to come back to schedule PPM replacement, but did not keep appointment. He presents now to schedule procedure with complaints of increased fatigue, SOB/CARLSON, and CP, and just having a generalized awful feeling. Attempts were made to interrogate his device, however the signal was too low to register. It was discussed with the patient to admit him to the hospital for PPM, as the patient is 99% dependent on his pacemaker. He denies any upcoming procedures scheduled. The patient will now be admitted to [facility] for CHF exacerbation and PPM replacement and will be transported by ambulance.  Patient was aggressively diuresed with IV Lasix and is now compensated.  Plans were to change the generator on 10.8.21, however patient was evaluated by Dr. Soto who felt that patient would best benefit from CRT-D therefore generator change was aborted on Friday.  EP has been consulted to evaluate patient for upgrade to CRT-D.      PMH: Mechanical Aortic Valve, HLP, CMO, PPM, ANGELA, Medical Noncompliance, HTN, CAD/CABG, Homelessness  PSH: PPM placement, mechanical aortic valve replacement ([place]), CABG ()  Social History: Current tobacco use, denies illicit drug use and EtOH use  Family History: Mother-HTN, cerebral aneurysm    Diagnostics:   Echo  10.6.21  Left ventricular ejection fraction is visually estimated at 50-55%.  E/A flow reversal noted. Suggestive of diastolic dysfunction.  Mildly dilated right atrium.  Pacemaker/AICD lead(s) was(were) visualized in RA cavity.  Mechanical valve in aortic position with a mean gradient of 17 mmHg.  Calcification of the mitral valve noted.  There is trace tricuspid regurgitation with RVSP estimated at 15mmHg.    Echo 5.12.21  Moderately dilated left ventricular dimension with poor function with an ejection fraction of 10-15%.  Severe mechanical aortic valve dysfunction: Vmax 4 m/sec, DI 0.09, MG 41 mmHg, decreased valve mobility. Moderate AI.  Pacemaker/AICD lead(s)  There is mild tricuspid regurgitation with RVSP estimated at 34 mmHg .  Recommend RAFAEL/fluoroscopy to evaluate mechanical aVR    MPI 11.26.18  SMALL, INFERIOR WALL ISCHEMIA.  SMALL, APICAL INFARCT..NORMAL ANTEROLATERAL WALL PERFUSION, WITH DUFFUSE HYPOKINESIA WITH NORMAL SYSTOLIC WALL THICKENING.  LV DILATATION WITH LV SYSTOLIC DYSFUNCTION. GLOBAL LVEF- 37 %.      Review of Systems   Constitutional:  No weakness, No fatigue.    Respiratory:  No shortness of breath, No cough.    Cardiovascular:  No chest pain, No palpitations, No bradycardia.    Gastrointestinal:  No nausea, No vomiting.    Genitourinary:  No dysuria.    Neurologic:  Alert and oriented X4.    All other systems are negative      Health Status   Allergies:    Allergic Reactions (Selected)  No Known Medication Allergies,    Allergies (1) Active Reaction  No Known Medication Allergies None Documented     Current medications:  (Selected)   Inpatient Medications  Ordered  Ambien: 10 mg, form: Tab, Oral, Once a day (at bedtime) PRN for insomnia, first dose 10/06/21 11:46:00 CDT, if patient is less than 70 years old  Benadryl: 25 mg, form: Cap, Oral, Once a day (at bedtime) PRN for insomnia, first dose 10/06/21 11:46:00 CDT  Benadryl: 25 mg, form: Cap, Oral, q4hr PRN for itching, first dose  10/06/21 11:46:00 CDT  Dextrose 50% and Water  (50 mL vial/syringe): 25 mL, 12.5 gm =, form: Injection, IV Push, As Directed PRN for blood glucose, Infuse over: 5 minute(s), first dose 10/09/21 8:05:00 CDT, Unconscious patient: Repeat as ordered per protocol.  Dextrose 50% and Water  (50 mL vial/syringe): 25 mL, 12.5 gm =, form: Injection, IV Push, Once PRN for blood glucose, Infuse over: 5 minute(s), first dose 10/09/21 8:05:00 CDT, Unconscious patient: Look for other source of altered mental status.  Dextrose 50% and Water  (50 mL vial/syringe): 50 mL, 25 gm =, form: Injection, IV Push, As Directed PRN for blood glucose, Infuse over: 5 minute(s), first dose 10/09/21 8:05:00 CDT, Unconscious patient: Repeat as ordered per protocol.  Dextrose 50% in Water intravenous solution: 25 mL, 12.5 gm =, form: Injection, IV Push, As Directed PRN for blood glucose, Infuse over: 5 minute(s), first dose 10/09/21 8:05:00 CDT, Conscious patient.  Entresto 24 mg-26 mg oral tablet: 1 tab(s), form: Tab, Oral, BID, first dose 10/06/21 11:54:00 CDT  Lasix: 40 mg, form: Tab, Oral, Daily, first dose 10/08/21 9:00:00 CDT  Lovenox: 60 mg, form: Injection, Subcutaneous, BID, first dose 10/09/21 21:00:00 CDT  Maalox Antacid with Anti-Gas: 30 mL, form: Susp, Oral, q4hr PRN for dyspepsia, first dose 10/06/21 11:46:00 CDT  Milk of Magnesia: 30 mL, form: Susp, Oral, Daily PRN for constipation, first dose 10/06/21 11:46:00 CDT  Norco  5 mg-325 mg oral tablet: 1 tab(s), form: Tab, Oral, q4hr PRN for pain, moderate, first dose 10/06/21 11:46:00 CDT  Norco  5 mg-325 mg oral tablet: 2 tab(s), form: Tab, Oral, q4hr PRN for pain, severe, first dose 10/06/21 11:46:00 CDT  Protonix 40 mg ORAL enteric coated tablet: 40 mg, form: Tab-EC, Oral, Daily, first dose 10/07/21 6:00:00 CDT  Senokot S: 1 tab(s), form: Tab, Oral, BID PRN for constipation, first dose 10/06/21 11:46:00 CDT, choose only if unrelieved by Milk of Magnesia or choose first if ordered  alone  Tylenol: 1,000 mg, form: Tab, Oral, q6hr PRN for pain, mild, first dose 10/06/21 11:46:00 CDT, or fever; No more than 4 grams in 24 hours  Tylenol: 650 mg, form: Supp, VT, q6hr PRN for pain, first dose 10/06/21 11:46:00 CDT, mild or fever if patient unable to swallow; No more than 4 grams in 24 hours  Vancomycin (for IVPB): 1,000 mg, form: Injection Surgical prophylaxis, IV Piggyback, On Call, Infuse over: 100 minute(s), first dose 10/07/21 10:25:00 CDT  Xanax: 0.25 mg, form: Tab, Oral, q6hr PRN for anxiety, first dose 10/06/21 11:46:00 CDT  Xylocaine Jelly 2% topical gel with applicator: 5 mL, form: Gel, TOP, Once PRN for other (see comment), first dose 10/06/21 11:46:00 CDT, for insertion of urinary catheter in males  aspirin 81 mg oral Delayed Release (EC) tablet: 81 mg, form: Tab-EC, Oral, Daily, first dose 10/07/21 9:00:00 CDT  glucagon: 1 mg, form: Injection, IM, q10min PRN for blood glucose, first dose 10/09/21 8:05:00 CDT, Conscious Patient with NO IV access available and BG < 45 mg/dl.  glucagon: 1 mg, form: Injection, IM, q10min PRN for blood glucose, first dose 10/09/21 8:05:00 CDT, Unconscious patient: Patient with NO IV access available and BG < 70 mg/dl.  hydrALAZINE: 10 mg, form: Injection, IV Push, q2hr PRN for hypertension, first dose 10/06/21 11:46:00 CDT, SBP > 160mmHg or DBP > 100 mmHg, if HR < 60 or when BP does not respond to Labetolol  insulin lispro: 2-14 units, form: Soln, Subcutaneous, As Directed PRN for blood glucose, first dose 10/09/21 8:05:00 CDT  labetalol: 10 mg, form: Soln, IV Push, q1hr PRN for hypertension, first dose 10/06/21 11:46:00 CDT, SBP > 160mmHg or DBP > 110 mmHg, may repeat hourly as necessary if HR > 60  metoprolol tartrate 25 mg oral tab: 25 mg, form: Tab, Oral, Daily, first dose 10/07/21 9:00:00 CDT  nitroglycerin: 0.4 mg, form: Tab-SL, SL, q5min PRN for chest pain, Order duration: 3 dose(s), first dose 10/06/21 11:46:00 CDT, stop date Limited # of times, if  systolic BP > 100 until pain relieved of at level 1  Pending Complete  Influenza Virus Vaccine, Inactivated: 0.5 mL, form: Susp, IM, Daily, Order duration: 1 dose(s), first dose 10/07/21 9:00:00 CDT, stop date 10/08/21 8:59:00 CDT  Suspended  Lovenox: 60 mg, form: Injection, Subcutaneous, BID, first dose 10/06/21 21:00:00 CDT  Prescriptions  Prescribed  Lasix 40 mg oral tablet: 40 mg = 1 tab(s), Oral, Daily, # 90 tab(s), 4 Refill(s), Pharmacy: [facility], 175, cm, Height/Length Dosing, 05/11/21 22:37:00 CDT, 64.3, kg, Weight Dosing, 05/11/21 22:37:00 CDT  Protonix 40 mg ORAL enteric coated tablet: 40 mg = 1 tab(s), Oral, Daily, # 90 tab(s), 4 Refill(s), Pharmacy: [facility], 175, cm, Height/Length Dosing, 05/11/21 22:37:00 CDT, 64.3, kg, Weight Dosing, 05/11/21 22:37:00 CDT  Toprol-XL 25 mg oral tablet, extended release: 25 mg = 1 tab(s), Oral, Daily, # 90 tab(s), 4 Refill(s), Pharmacy: [facility], 175, cm, Height/Length Dosing, 05/11/21 22:37:00 CDT, 64.3, kg, Weight Dosing, 05/11/21 22:37:00 CDT  aspirin 81 mg oral Delayed Release (EC) tablet: 81 mg = 1 tab(s), Oral, Daily, # 90 tab(s), 4 Refill(s), Pharmacy: [facility], 175, cm, Height/Length Dosing, 05/11/21 22:37:00 CDT, 64.3, kg, Weight Dosing, 05/11/21 22:37:00 CDT  nitroglycerin 0.4 mg sublingual TAB: 0.4 mg = 1 tab(s), SL, q5min, PRN PRN chest pain, not to exceed 3 doses/15 min--if pain persists, seek medical attention, # 1 bottle(s), 1 Refill(s), Pharmacy: [facility], 175, cm, Height/Length Dosing, 05/11/21 2...  potassium chloride 10 mEq oral CAPSULE extended release: 10 mEq = 1 cap(s), Oral, Daily, # 10 cap(s), 0 Refill(s), Pharmacy: [facility], 175, cm, Height/Length Dosing, 05/11/21 22:37:00 CDT, 64.3, kg, Weight Dosing,  05/11/21 22:37:00 CDT  Documented Medications  Documented  Entresto 24 mg-26 mg oral tablet: 1 tab(s), Oral, BID  metoprolol tartrate 25 mg oral tab: 25 mg = 1 tab(s), Oral, Daily  warfarin 3 mg oral tablet: 3 mg = 1 tab(s), Oral, Daily,    Medications (29) Active  Scheduled: (7)  aspirin 81 mg EC Tab  81 mg 1 tab(s), Oral, Daily  enoxaparin 80mg/0.8ml inj  60 mg 0.6 mL, Subcutaneous, BID  furosemide 40 mg Tab UD  40 mg 1 tab(s), Oral, Daily  metoprolol tart. 25 mg Tab UD  25 mg 1 tab(s), Oral, Daily  pantoprazole 40 mg EC Tab  40 mg 1 tab(s), Oral, Daily  sacubitril-valsartan 24 mg-26 mg Tab  1 tab(s), Oral, BID  vancomycin  1,000 mg 1 EA, IV Piggyback, On Call  Continuous: (0)  PRN: (22)  acetaminophen 500 mg Tab  1,000 mg 2 tab(s), Oral, q6hr  acetaminophen 650 mg Sup UD  650 mg 1 supp, NH, q6hr  Al hydrox/Mg hydrox/simeth -400-40 mg/5 mL Emilee UD  30 mL, Oral, q4hr  alprazolam 0.25 mg Tab UD  0.25 mg 1 tab(s), Oral, q6hr  dextrose 50% abboj  12.5 gm 25 mL, IV Push, As Directed  dextrose 50% abboj  12.5 gm 25 mL, IV Push, Once  dextrose 50% abboj  12.5 gm 25 mL, IV Push, As Directed  dextrose 50% abboj  25 gm 50 mL, IV Push, As Directed  diphenhydrAMINE 25 mg Cap UD  25 mg 1 cap(s), Oral, q4hr  diphenhydrAMINE 25 mg Cap UD  25 mg 1 cap(s), Oral, Once a day (at bedtime)  docusate-senna 50mg -8.6mg Tab UD  1 tab(s), Oral, BID  glucagon recombinant 1 mg Inj  1 mg 1 EA, IM, q10min  glucagon recombinant 1 mg Inj  1 mg 1 EA, IM, q10min  hydrALAzine 20 mg/ml Inj- 1 mL  10 mg 0.5 mL, IV Push, q2hr  hydrocodone 5mg/APAP 325 mg  1 tab(s), Oral, q4hr  hydrocodone 5mg/APAP 325 mg  2 tab(s), Oral, q4hr  insulin lispro 100 units/mL - 10mL vial  2-14 units, Subcutaneous, As Directed  labetalol 5 mg/mL 20mL vial  10 mg 2 mL, IV Push, q1hr  lidocaine topical 2% Jelly -5mL  5 mL, TOP, Once  magnesium hydroxide 8% Emilee (MOM) UD  30 mL, Oral, Daily  Nitroglycerin 0.4 mg TAB  0.4 mg 1 tab(s), SL, q5min  zolpidem 5 mg Tab UD  10 mg  2 tab(s), Oral, Once a day (at bedtime)        Physical Examination   General:  Alert and oriented, No acute distress.    Eye:  Normal conjunctiva.    HENT:  Normocephalic, Oral mucosa is moist.    Neck:  Supple, Non-tender.    Respiratory:  Lungs are clear to auscultation, Respirations are non-labored.    Cardiovascular:  Normal rate, Regular rhythm, Audible Mechanical Valve Clicks Noted.    Gastrointestinal:  Soft.       Vital Signs (last 24 hrs)_____  Last Charted___________  Temp Oral     36.6 DegC  (OCT 11 08:11)  Heart Rate Peripheral   L 58bpm  (OCT 11 08:11)  Resp Rate         19 br/min  (OCT 11 08:11)  SBP      112 mmHg  (OCT 11 08:11)  DBP      67 mmHg  (OCT 11 08:11)  SpO2      96 %  (OCT 11 08:11)  Weight      61.2 kg  (OCT 11 05:00)     Integumentary:  Warm, Dry, Intact.    Neurologic:  Alert, Oriented, No focal deficits.    Psychiatric:  Cooperative, Appropriate mood & affect.       Review / Management   Laboratory Results   Today's Lab Results : PowerNote Discrete Results   10/11/2021 4:30 CDT      Sodium Lvl                136 mmol/L                             Potassium Lvl             4.3 mmol/L                             Chloride                  102 mmol/L                             CO2                       23 mmol/L                             Calcium Lvl               9.8 mg/dL                             Magnesium Lvl             2.00 mg/dL                             Glucose Lvl               97 mg/dL                             BUN                       24.0 mg/dL                             Creatinine                0.83 mg/dL                             Bili Total                0.4 mg/dL                             Bili Direct               0.2 mg/dL                             Bili Indirect             0.20 mg/dL                             AST                       19 unit/L                             ALT                       10 unit/L                             Alk Phos                   85 unit/L    10/10/2021 5:50 CDT      WBC                       8.0 x10(3)/mcL                             RBC                       4.85 x10(6)/mcL                             Hgb                       15.6 gm/dL                             Hct                       45.6 %                             Platelet                  163 x10(3)/mcL        Cardiac Monitor:  At  10/11/2021 09:10:00, Rate  57 beats per minute, Paced.    Condition:  Stable.       Impression and Plan   Impression:  ICMO (EF 25-30%), PPM NOE missed follow up for upgrade/gen change   - Small Inferior Ischemia on Stress Test (2018), Refused Invasive Workup in the Past  History of Heart Block/Cardiac Conduction Disease - S/P PPM Placement (SJM Device)  Acute/Chronic Systolic Heart Failure (Compensated)  VHD- S/P Mechanical AVR, TR (Moderate to Severe)   - Noncompliance with Warfarin (INR Subtherapeutic) (On FD Lovenox)  Chronic Noncompliance with Medical Therapy  Leukocytosis (Resolved)   - Remains Afebrile  COPD  DM II  Homelessness  Nicotine Dependence (Active Smoker)  No History of GI Bleed    Plan:  Continue BB, Lasix and ARNI  Hold Lovenox this AM  Keep NPO  Had long discussion with patient and he has decided to forego CRT-D upgrade, he would like to just have generator changed.  Risks/benefits/alternatives discussed with patient about upgrade vs. generator change.  Patient understands risks of sudden cardiac death with foregoing CRT-D therapy and wishes to proceed with generator change for PPM.  Labs in AM: CBC, CMP and Mg
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
## 2022-04-30 NOTE — ED PROVIDER NOTES
Patient:   Markus Guillen JR             MRN: [id number]            FIN: [id number]               Age:   60 years     Sex:  Male     :  1960   Associated Diagnoses:   Dyspnea; Pleural effusion on right; Elevated d-dimer; Mechanical heart valve present; Bullous emphysema   Author:   Leodan Prince      Basic Information   Time seen: Immediately upon arrival.   History source: Patient.   Arrival mode: Ambulance.   History limitation: None.   Additional information: Chief Complaint from Nursing Triage Note : Chief Complaint   2020 9:00 CDT       Chief Complaint           c/o bilateral foot pain and swelling. has callouses on bottom of both feet. dependent redness noted both feet . non compliant with lasix and coumadin. states has not had meds since he has been homeless. recently got into apartment  .      History of Present Illness   The patient presents with difficulty breathing and hx of copd, mechanical heart valve for which he should be taking coumadin, pacemaker placement, non-compliant - off all medications for a least a year, reports being homeless to just recently, complains of sob and foot pain/swelling.  The onset was chronic and increased for 1 week.  The course/duration of symptoms is constant.  Degree at onset mild.  Degree at present moderate.  There are Exacerbating factorss including exertion and lying flat.  There are Relieving factorss including rest and sitting upright.  Risk factors consist of chronic obstructive pulmonary disease, non-compliance and mechanical heart valve, pacemaker .  Prior episodes: chronic.  Therapy today: none.  Associated symptoms: none.        Review of Systems   Constitutional symptoms:  Negative except as documented in HPI, no fever, no chills, no sweats, no weakness, no fatigue.    Skin symptoms:  Negative except as documented in HPI.   ENMT symptoms:  Negative except as documented in HPI.   Respiratory symptoms:  Negative except as documented in HPI.    Cardiovascular symptoms:  Negative except as documented in HPI, no chest pain, no palpitations, no tachycardia, no syncope, no diaphoresis, no peripheral edema.    Gastrointestinal symptoms:  Negative except as documented in HPI, no abdominal pain, no nausea, no vomiting, no diarrhea, no constipation.    Genitourinary symptoms:  Negative except as documented in HPI.   Musculoskeletal symptoms:  Negative except as documented in HPI.   Neurologic symptoms:  Negative except as documented in HPI.   Psychiatric symptoms:  Negative except as documented in HPI.   Endocrine symptoms:  Negative except as documented in HPI.   Allergy/immunologic symptoms:  Negative except as documented in HPI.             Additional review of systems information: All other systems reviewed and otherwise negative.      Health Status   Allergies:    Allergic Reactions (Selected)  No Known Medication Allergies,    Allergies (1) Active Reaction  No Known Medication Allergies None Documented  .   Medications:  (Selected)   Prescriptions  Prescribed  Lasix 20 mg oral tablet: 60 mg = 3 tab(s), Oral, Daily, # 12 tab(s), 0 Refill(s)  Medrol Dosepak 4 mg oral tablet: = 1 packet(s), Oral, As Directed, as directed on package labeling, # 21 tab(s), 0 Refill(s)  ProAir HFA 90 mcg/inh inhalation aerosol with adapter: 2 puff(s), INH, q4hr, PRN PRN for wheezing, # 1 EA, 0 Refill(s)  warfarin 2.5 mg oral tablet: 2.5 mg = 1 tab(s), Oral, At Bedtime, # 30 tab(s), 0 Refill(s), Pharmacy: [id number]  warfarin 2.5 mg oral tablet: 2.5 mg = 1 tab(s), Oral, At Bedtime, Must attend scheduled apointment to have this medication refilled., # 15 tab(s), 0 Refill(s), Pharmacy: [id number]  Documented Medications  Documented  WARFARIN SODIUM 2 5 M.5 mg = 1 tab(s), Oral, Daily.      Past Medical/ Family/ Social History   Medical history:    No active or resolved past medical history items have been selected or recorded., Reviewed as documented in  chart.   Surgical history:    Arm repair (SNOMED CT 171691599)..   Family history:    No family history items have been selected or recorded..   Social history:    Social & Psychosocial Habits    Alcohol  11/23/2018  Use: Never    Substance Use  11/23/2018  Use: Never    Tobacco  11/03/2019  Use: 10 or more cigarettes (1/    Patient Wants Consult For Cessation Counseling N/A    06/14/2020  Use: 10 or more cigarettes (1/    Patient Wants Consult For Cessation Counseling No    Abuse/Neglect  11/03/2019  SHX Any signs of abuse or neglect No    06/14/2020  SHX Any signs of abuse or neglect No    Feels unsafe at home: No    Safe place to go: Yes  , Reviewed as documented in chart.   Problem list:    Active Problems (3)  Cardiac pacemaker   Mechanical heart valve prosthesis   Tobacco user   .      Physical Examination               Vital Signs   Vital Signs   6/14/2020 9:00 CDT       Temperature Oral          37 DegC                             Temperature Oral (calculated)             98.60 DegF                             Peripheral Pulse Rate     71 bpm                             Respiratory Rate          20 br/min                             SpO2                      97 %                             Oxygen Therapy            Room air                             Systolic Blood Pressure   121 mmHg                             Diastolic Blood Pressure  81 mmHg  .      Vital Signs (last 24 hrs)_____  Last Charted___________  Temp Oral     37 DegC  (JUN 14 09:00)  Heart Rate Peripheral   71 bpm  (JUN 14 09:00)  Resp Rate         20 br/min  (JUN 14 09:00)  SBP      121 mmHg  (JUN 14 09:00)  DBP      81 mmHg  (JUN 14 09:00)  SpO2      97 %  (JUN 14 09:00)  Weight      70.9 kg  (JUN 14 09:00)  .   Basic Oxygen Information   6/14/2020 9:00 CDT       SpO2                      97 %                             Oxygen Therapy            Room air  .   General:  Alert, no acute distress.    Skin:  Warm, dry, pink, intact, no pallor.     Head:  Normocephalic.   Neck:  Supple, trachea midline.    Ears, nose, mouth and throat:  Tympanic membranes clear, oral mucosa moist, no pharyngeal erythema or exudate.    Cardiovascular:  Regular rate and rhythm, No murmur, Normal peripheral perfusion, No edema.    Respiratory:  Respirations are non-labored, breath sounds are equal, Symmetrical chest wall expansion, decreased in bases.    Chest wall:  No tenderness, No deformity.    Back:  Nontender, Normal range of motion.    Musculoskeletal:  mod lilo pedal edema, papable distal pulses.   Gastrointestinal:  Soft, Nontender, Non distended, Normal bowel sounds, No organomegaly.    Neurological:  Alert and oriented to person, place, time, and situation, No focal neurological deficit observed.    Psychiatric:  Cooperative, appropriate mood & affect.       Medical Decision Making   Documents reviewed:  Emergency department nurses' notes.   Orders  Launch Orders   Laboratory:  BNP-Pro (Order): Stat collect, 6/14/2020 9:47 CDT, Blood, Lab Collect, 6/14/2020 9:47 CDT  CMP (Order): Stat collect, 6/14/2020 9:47 CDT, Blood, Lab Collect, 6/14/2020 9:47 CDT  Troponin-I (Order): Stat collect, 6/14/2020 9:47 CDT, Blood, Lab Collect, 6/14/2020 9:47 CDT  INR - Protime (Order): Stat collect, 6/14/2020 9:47 CDT, Blood, Lab Collect, 6/14/2020 9:47 CDT  PTT (Order): Stat collect, 6/14/2020 9:47 CDT, Blood, Lab Collect, 6/14/2020 9:47 CDT  CK MB (Order): Stat collect, 6/14/2020 9:47 CDT, Blood, Lab Collect, 6/14/2020 9:47 CDT  CPK (Order): Stat collect, 6/14/2020 9:47 CDT, Blood, Lab Collect, 6/14/2020 9:47 CDT  CBC w/ Auto Diff (Order): Stat collect, 6/14/2020 9:47 CDT, Blood, Once, Stop date 6/14/2020 9:47 CDT, Lab Collect, 6/14/2020 9:47 CDT  Patient Care:  Cardiac Monitoring (Order): 6/14/2020 9:47 CDT, Constant Order  Pulse Oximetry (Order): 6/14/2020 9:47 CDT  Radiology:  XR Chest 2 Views (Order): Stat, 6/14/2020 9:47 CDT, Dyspnea, None, Stretcher, Patient Has IV?, Patient on  Oxygen?, Rad Type, Not Scheduled  Cardiology:  EKG Adult 12 Lead (Order): 6/14/2020 9:47 CDT, Stat, Chest Pain, Patient Has IV, Patient on O2, Standard Precautions, -1, -1, 6/14/2020 9:47 CDT.   Electrocardiogram:  Time 6/14/2020 09:55:00, rate 66, No ST-T changes, no ectopy, EP Interp, paced rhythm, reviewed by Dr Yung (ER staff).    Results review:  Lab results : Lab View   6/14/2020 11:15 CDT      COVID-19 Rapid            NEGATIVE    6/14/2020 10:14 CDT      Sodium Lvl                136 mmol/L                             Potassium Lvl             4.2 mmol/L                             Chloride                  107 mmol/L                             CO2                       25 mmol/L                             Calcium Lvl               8.6 mg/dL                             Glucose Lvl               98 mg/dL                             BUN                       18 mg/dL                             Creatinine                0.80 mg/dL                             eGFR-AA                   >105 mL/min                             eGFR-MICHELLE                  104 mL/min                             Bili Total                0.7 mg/dL                             Bili Direct               0.2 mg/dL                             Bili Indirect             0.5 mg/dL                             AST                       27 unit/L                             ALT                       43 unit/L                             Alk Phos                  119 unit/L  HI                             Total Protein             6.8 gm/dL                             Albumin Lvl               3.4 gm/dL                             Globulin                  3.40 gm/mL                             A/G Ratio                 1.0 ratio  LOW                             NT pro BNP.               3,998 pg/mL  HI                             Total CK                  72 unit/L                             CK MB                     1.8 ng/mL                              Troponin-I                0.026 ng/mL                             PT                        13.6 second(s)                             INR                       1.08                             PTT                       25.8 second(s)                             D-Dimer                   3.48 mcg/ml FEU  HI                             WBC                       8.6 x10(3)/mcL                             RBC                       4.17 x10(6)/mcL  LOW                             Hgb                       14.1 gm/dL                             Hct                       41.6 %                             Platelet                  182 x10(3)/mcL                             MCV                       99.8 fL                             MCH                       33.8 pg                             MCHC                      33.9 gm/dL                             RDW                       13.3 %                             MPV                       9.5 fL                             Abs Neut                  6.50 x10(3)/mcL                             Neutro Auto               76 %  NA                             Lymph Auto                15 %  NA                             Mono Auto                 7 %  NA                             Eos Auto                  2 %  NA                             Abs Eos                   0.2 x10(3)/mcL                             Basophil Auto             0 %  NA                             Abs Neutro                6.50 x10(3)/mcL                             Abs Lymph                 1.3 x10(3)/mcL                             Abs Mono                  0.6 x10(3)/mcL                             Abs Baso                  0.0 x10(3)/mcL                             IG%                       0 %  NA                             IG#                       0.0200  NA    .   Radiology results:  Rad Results (ST)  < 12 hrs   Accession: [id number]  Order: CT Angio Chest PE W  Contrast  Report Dt/Tm: 06/14/2020 13:00  Report:   Clinical History  Shortness of breath     Technique  Axial images of the chest were obtained with contrast. Sagittal and  coronal reconstructed images were available for review.     Comparison  No prior imaging available for comparison.        Findings  PULMONARY ARTERY:No evidence of pulmonary thromboembolism.     AORTA: Normal in course and caliber.     THYROID GLAND: The visualized thyroid is unremarkable.     AIRWAYS: Trachea is midline and tracheobronchial tree is patent.      HEART: The heart is prominent size with postsurgical changes. No  pericardial effusion.     LUNGS: Large bilateral upper lobe bullous emphysematous disease  greatest on the left. There is a right pleural effusion with  consolidative changes of the right lower lobe laterally. Infectious  process is not excluded.     LYMPH NODES: Prominent lymph nodes are noted within the mediastinum  measuring approximately 2 cm. These may be reactive, however recommend  follow to resolution to exclude underlying malignancy.     BONES: No displaced fracture. No aggressive appearing osseous lesion  identified.     UPPER ABDOMEN: The visualized abdomen is unremarkable.        Impression  No evidence of pulmonary thromboembolism.      Large bilateral upper lobe bullous emphysematous disease greatest on  the left. There is a right pleural effusion with consolidative changes  of the right lower lobe laterally. Infectious process is not excluded.     Prominent lymph nodes are noted within the mediastinum measuring  approximately 2 cm. These may be reactive, however recommend follow to  resolution to exclude underlying malignancy.    Accession: [id number]  Order: XR Chest 2 Views  Report Dt/Tm: 06/14/2020 10:33  Report:   Clinical History  Shortness of breath     Technique  2 views of the chest.     Comparison  11/28/2018     Findings  Right hilar prominence with right pleural effusion. Additional small  left  pleural effusion is also noted. The cardiomediastinal silhouette  is unchanged.     Impression  Right hilar opacification with right greater than left pleural  effusion. Findings believed related to infectious process, however  recommend follow to resolution to exclude malignancy.        .      Procedure   Critical care note   Total time: 30 minutes spent engaged in work directly related to patient care and/ or available for direct patient care.   Critical condition(s) addressed for impending deterioration include: respiratory, cardiovascular.   Associated risk factors: hypotension, shock, hypoxia.   Management: bedside assessment, supervision of care, Interpretation (chest x-ray, electrocardiogram, blood pressure), Interventions hemodynamic management.   Performed by: self.      Impression and Plan   Diagnosis   Dyspnea (RVH21-FM R06.00)   Pleural effusion on right (DZS87-JM J90)   Elevated d-dimer (AWG55-TZ R79.89)   Mechanical heart valve present (ZHI13-RG Z95.2)   Bullous emphysema (JPJ04-ZC J43.9)      Calls-Consults   -  consulted Dr Yung (ER staff) who advised to consult medicine service for admission, consulted medicine service.   Plan   Disposition: Admit time  6/14/2020 13:20:00, Admit to Inpatient Unit.    I, Dr. Anthony Yung, performed a face to face evaluation of this Pt (Markus Mindy) and my history reveal hx of mechanical valve, of coumadin for about 1 yrs, increaing SOB today and my physical exam reveal bilateral LE edema, diminshed breath sounds in lung bases. This case was initially evaluated by Leodan Brown under my supervision and I agree with his/her documentation, procedures and plan. I personally performed the history, physical and MDM for this patient.

## 2022-05-05 NOTE — HISTORICAL [facility] CERNER
This is a historical note converted from Are[employer]. Formatting and pictures may have been removed.  Please reference Areli for original formatting and attached multimedia. Patient was smoking in the room?and nurses took away his?cigarettes.? Patient got?agitated and try to pull out?IV line. ?Nurses explained to patient the importance of?staying?and getting medical treatment?versus risks of leaving.? However,?patient is adamant about leaving AGAINST MEDICAL ADVICE.? Nurse helped him?pull out his IV?line. Patient left before signing AMA paperwork.